# Patient Record
Sex: MALE | Race: WHITE | NOT HISPANIC OR LATINO | URBAN - NONMETROPOLITAN AREA
[De-identification: names, ages, dates, MRNs, and addresses within clinical notes are randomized per-mention and may not be internally consistent; named-entity substitution may affect disease eponyms.]

---

## 2023-06-15 ENCOUNTER — PROCEDURE VISIT (OUTPATIENT)
Dept: PRIMARY CARE | Facility: CLINIC | Age: 43
End: 2023-06-15

## 2023-06-15 VITALS
HEART RATE: 72 BPM | SYSTOLIC BLOOD PRESSURE: 110 MMHG | WEIGHT: 212 LBS | DIASTOLIC BLOOD PRESSURE: 65 MMHG | OXYGEN SATURATION: 97 % | BODY MASS INDEX: 29.57 KG/M2

## 2023-06-15 DIAGNOSIS — D49.2 SKIN NEOPLASM: Primary | ICD-10-CM

## 2023-06-15 PROCEDURE — 88305 TISSUE EXAM BY PATHOLOGIST: CPT

## 2023-06-15 PROCEDURE — 11401 EXC TR-EXT B9+MARG 0.6-1 CM: CPT | Performed by: FAMILY MEDICINE

## 2023-06-15 PROCEDURE — 88305 TISSUE EXAM BY PATHOLOGIST: CPT | Performed by: PATHOLOGY

## 2023-06-15 PROCEDURE — 99212 OFFICE O/P EST SF 10 MIN: CPT | Performed by: FAMILY MEDICINE

## 2023-06-15 PROCEDURE — 0753T DGTZ GLS MCRSCP SLD LEVEL IV: CPT

## 2023-06-15 NOTE — PROGRESS NOTES
Patient ID: Nirmal Middleton is a 43 y.o. male.  2 moles 1 upper mid back 1 and left shoulder wanted checked  Area anterior chest of concern which is a hemangioma has bled a few times and been quite annoying to try and get it to stop    Incisional Biopsy    Date/Time: 6/15/2023 4:48 PM    Performed by: Anish Olguin DO  Authorized by: Anish Olguin DO    Consent:     Consent obtained:  Verbal    Consent given by:  Patient    Risks discussed:  Bleeding, infection and nerve damage  Universal protocol:     Procedure explained and questions answered to patient or proxy's satisfaction: yes      Patient identity confirmed:  Verbally with patient  Pre-procedure details:     Skin preparation:  Povidone-iodine  Sedation:     Sedation type:  None  Anesthesia:     Anesthesia method:  Local infiltration    Local anesthetic:  Lidocaine 2% WITH epi  Procedure specific details:      Elliptical incision 3 mm margin # 15 blade , closed # 6 simple 3-0 Ethilon sutures good hemostasis and approx obtained    Post-procedure details:     Procedure completion:  Tolerated well, no immediate complications  Subjective   Patient ID: Nirmal Middleton is a 43 y.o. male who presents for mole (Two moles on back, one on chest he wants looked at).    HPI   Chest only few years     Review of Systems    Objective   /65 (BP Location: Right arm, Patient Position: Sitting, BP Cuff Size: Large adult)   Pulse 72   Wt 96.2 kg (212 lb)   SpO2 97%   BMI 29.57 kg/m²     Physical Exam  Alert oriented x3 right upper back approximately 7 x 5 mm dark flat lesion irregular edges, left upper shoulder around symmetric dark lesion not concerning, anterior upper left chest just inferior and medial to breast hemangioma this is approximately size of a BB 2 to 3 mm narrow stalk  Assessment/Plan   Problem List Items Addressed This Visit    None  Visit Diagnoses       Skin neoplasm    -  Primary    Relevant Orders    Dermatopathology-[facility]    Incisional Biopsy         Discussed the need to remove with 1 concerning lesion on back which was performed patient tolerated very well.  Has sutures will need these removed in approximately 1 week.  Complete excision of lesion upper back performed as per above.  Concerning because it was dark in color and irregular edge.  Patient tolerated well closed with sutures as above.

## 2023-06-22 ENCOUNTER — OFFICE VISIT (OUTPATIENT)
Dept: PRIMARY CARE | Facility: CLINIC | Age: 43
End: 2023-06-22

## 2023-06-22 VITALS
WEIGHT: 209 LBS | BODY MASS INDEX: 29.15 KG/M2 | HEART RATE: 63 BPM | TEMPERATURE: 97.8 F | DIASTOLIC BLOOD PRESSURE: 72 MMHG | OXYGEN SATURATION: 98 % | SYSTOLIC BLOOD PRESSURE: 112 MMHG

## 2023-06-22 DIAGNOSIS — D49.2 SKIN NEOPLASM: Primary | ICD-10-CM

## 2023-06-22 PROBLEM — E78.5 HYPERLIPEMIA: Status: ACTIVE | Noted: 2023-06-22

## 2023-06-22 PROBLEM — E66.3 OVERWEIGHT: Status: ACTIVE | Noted: 2023-06-22

## 2023-06-22 PROBLEM — L98.0 PYOGENIC GRANULOMA: Status: RESOLVED | Noted: 2023-06-22 | Resolved: 2023-06-22

## 2023-06-22 PROCEDURE — 99024 POSTOP FOLLOW-UP VISIT: CPT | Performed by: FAMILY MEDICINE

## 2023-06-22 NOTE — PROGRESS NOTES
Subjective   Patient ID: Nirmal Middleton is a 43 y.o. male who presents for Suture / Staple Removal (Suture removal back).    HPI follow-up excision of skin lesion a week ago  Doing better  Does have some mild itching    Review of Systems    Objective   /72   Pulse 63   Temp 36.6 °C (97.8 °F)   Wt 94.8 kg (209 lb)   SpO2 98%   BMI 29.15 kg/m²     Physical Exam  Alert and oriented x3  Incision site healing as expected with very slight erythema around sutures consistent with mild reaction  Sutures removed without complication  Steri-Strip was placed over wound    Assessment/Plan   Problem List Items Addressed This Visit    None  Visit Diagnoses       Skin neoplasm    -  Primary        Skin biopsy report is not back yet patient informed of this,  To call if we do not inform of results within a week.  Sutures removed without complication very slight separation at surface no drainage again just scant erythema at suture base.

## 2023-07-12 LAB
COMPLETE PATHOLOGY REPORT: NORMAL
CONVERTED CLINICAL DIAGNOSIS-HISTORY: NORMAL
CONVERTED FINAL DIAGNOSIS: NORMAL
CONVERTED FINAL REPORT PDF LINK TO COPY AND PASTE: NORMAL
CONVERTED GROSS DESCRIPTION: NORMAL